# Patient Record
Sex: MALE | Race: ASIAN | NOT HISPANIC OR LATINO
[De-identification: names, ages, dates, MRNs, and addresses within clinical notes are randomized per-mention and may not be internally consistent; named-entity substitution may affect disease eponyms.]

---

## 2019-02-20 ENCOUNTER — TRANSCRIPTION ENCOUNTER (OUTPATIENT)
Age: 34
End: 2019-02-20

## 2019-02-21 ENCOUNTER — RESULT REVIEW (OUTPATIENT)
Age: 34
End: 2019-02-21

## 2019-02-21 ENCOUNTER — INPATIENT (INPATIENT)
Facility: HOSPITAL | Age: 34
LOS: 2 days | Discharge: HOME | DRG: 419 | End: 2019-02-24
Attending: SURGERY | Admitting: SURGERY
Payer: COMMERCIAL

## 2019-02-21 VITALS
HEART RATE: 98 BPM | RESPIRATION RATE: 18 BRPM | DIASTOLIC BLOOD PRESSURE: 102 MMHG | SYSTOLIC BLOOD PRESSURE: 164 MMHG | OXYGEN SATURATION: 100 % | TEMPERATURE: 98 F | WEIGHT: 184.97 LBS

## 2019-02-21 DIAGNOSIS — K81.9 CHOLECYSTITIS, UNSPECIFIED: ICD-10-CM

## 2019-02-21 LAB
ALBUMIN SERPL ELPH-MCNC: 4.5 G/DL — SIGNIFICANT CHANGE UP (ref 3.3–5)
ALP SERPL-CCNC: 138 U/L — HIGH (ref 40–120)
ALT FLD-CCNC: 22 U/L — SIGNIFICANT CHANGE UP (ref 10–45)
ANION GAP SERPL CALC-SCNC: 14 MMOL/L — SIGNIFICANT CHANGE UP (ref 5–17)
APPEARANCE UR: CLEAR — SIGNIFICANT CHANGE UP
APTT BLD: 29.9 SEC — SIGNIFICANT CHANGE UP (ref 27.5–36.3)
AST SERPL-CCNC: 21 U/L — SIGNIFICANT CHANGE UP (ref 10–40)
BASOPHILS # BLD AUTO: 0 K/UL — SIGNIFICANT CHANGE UP (ref 0–0.2)
BASOPHILS NFR BLD AUTO: 0.2 % — SIGNIFICANT CHANGE UP (ref 0–2)
BILIRUB SERPL-MCNC: 0.4 MG/DL — SIGNIFICANT CHANGE UP (ref 0.2–1.2)
BILIRUB UR-MCNC: NEGATIVE — SIGNIFICANT CHANGE UP
BLD GP AB SCN SERPL QL: NEGATIVE — SIGNIFICANT CHANGE UP
BUN SERPL-MCNC: 13 MG/DL — SIGNIFICANT CHANGE UP (ref 7–23)
CALCIUM SERPL-MCNC: 9.9 MG/DL — SIGNIFICANT CHANGE UP (ref 8.4–10.5)
CHLORIDE SERPL-SCNC: 96 MMOL/L — SIGNIFICANT CHANGE UP (ref 96–108)
CO2 SERPL-SCNC: 24 MMOL/L — SIGNIFICANT CHANGE UP (ref 22–31)
COLOR SPEC: SIGNIFICANT CHANGE UP
CREAT SERPL-MCNC: 0.91 MG/DL — SIGNIFICANT CHANGE UP (ref 0.5–1.3)
DIFF PNL FLD: NEGATIVE — SIGNIFICANT CHANGE UP
EOSINOPHIL # BLD AUTO: 0.1 K/UL — SIGNIFICANT CHANGE UP (ref 0–0.5)
EOSINOPHIL NFR BLD AUTO: 0.8 % — SIGNIFICANT CHANGE UP (ref 0–6)
GLUCOSE SERPL-MCNC: 109 MG/DL — HIGH (ref 70–99)
GLUCOSE UR QL: NEGATIVE — SIGNIFICANT CHANGE UP
HCT VFR BLD CALC: 39.2 % — SIGNIFICANT CHANGE UP (ref 39–50)
HGB BLD-MCNC: 14 G/DL — SIGNIFICANT CHANGE UP (ref 13–17)
INR BLD: 1.07 RATIO — SIGNIFICANT CHANGE UP (ref 0.88–1.16)
KETONES UR-MCNC: NEGATIVE — SIGNIFICANT CHANGE UP
LEUKOCYTE ESTERASE UR-ACNC: NEGATIVE — SIGNIFICANT CHANGE UP
LIDOCAIN IGE QN: 31 U/L — SIGNIFICANT CHANGE UP (ref 7–60)
LYMPHOCYTES # BLD AUTO: 1.9 K/UL — SIGNIFICANT CHANGE UP (ref 1–3.3)
LYMPHOCYTES # BLD AUTO: 10.7 % — LOW (ref 13–44)
MCHC RBC-ENTMCNC: 29.5 PG — SIGNIFICANT CHANGE UP (ref 27–34)
MCHC RBC-ENTMCNC: 35.8 GM/DL — SIGNIFICANT CHANGE UP (ref 32–36)
MCV RBC AUTO: 82.4 FL — SIGNIFICANT CHANGE UP (ref 80–100)
MONOCYTES # BLD AUTO: 1.1 K/UL — HIGH (ref 0–0.9)
MONOCYTES NFR BLD AUTO: 6.4 % — SIGNIFICANT CHANGE UP (ref 2–14)
NEUTROPHILS # BLD AUTO: 14.8 K/UL — HIGH (ref 1.8–7.4)
NEUTROPHILS NFR BLD AUTO: 82 % — HIGH (ref 43–77)
NITRITE UR-MCNC: NEGATIVE — SIGNIFICANT CHANGE UP
PH UR: 6.5 — SIGNIFICANT CHANGE UP (ref 5–8)
PLATELET # BLD AUTO: 259 K/UL — SIGNIFICANT CHANGE UP (ref 150–400)
POTASSIUM SERPL-MCNC: 3.8 MMOL/L — SIGNIFICANT CHANGE UP (ref 3.5–5.3)
POTASSIUM SERPL-SCNC: 3.8 MMOL/L — SIGNIFICANT CHANGE UP (ref 3.5–5.3)
PROT SERPL-MCNC: 8.5 G/DL — HIGH (ref 6–8.3)
PROT UR-MCNC: NEGATIVE — SIGNIFICANT CHANGE UP
PROTHROM AB SERPL-ACNC: 12.3 SEC — SIGNIFICANT CHANGE UP (ref 10–12.9)
RBC # BLD: 4.75 M/UL — SIGNIFICANT CHANGE UP (ref 4.2–5.8)
RBC # FLD: 11.8 % — SIGNIFICANT CHANGE UP (ref 10.3–14.5)
RH IG SCN BLD-IMP: POSITIVE — SIGNIFICANT CHANGE UP
RH IG SCN BLD-IMP: POSITIVE — SIGNIFICANT CHANGE UP
SODIUM SERPL-SCNC: 134 MMOL/L — LOW (ref 135–145)
SP GR SPEC: 1.02 — SIGNIFICANT CHANGE UP (ref 1.01–1.02)
UROBILINOGEN FLD QL: NEGATIVE — SIGNIFICANT CHANGE UP
WBC # BLD: 18 K/UL — HIGH (ref 3.8–10.5)
WBC # FLD AUTO: 18 K/UL — HIGH (ref 3.8–10.5)

## 2019-02-21 PROCEDURE — 76700 US EXAM ABDOM COMPLETE: CPT | Mod: 26

## 2019-02-21 PROCEDURE — 99285 EMERGENCY DEPT VISIT HI MDM: CPT | Mod: 25

## 2019-02-21 PROCEDURE — 71046 X-RAY EXAM CHEST 2 VIEWS: CPT | Mod: 26

## 2019-02-21 RX ORDER — KETOROLAC TROMETHAMINE 30 MG/ML
15 SYRINGE (ML) INJECTION ONCE
Qty: 0 | Refills: 0 | Status: DISCONTINUED | OUTPATIENT
Start: 2019-02-21 | End: 2019-02-21

## 2019-02-21 RX ORDER — ACETAMINOPHEN 500 MG
1000 TABLET ORAL ONCE
Qty: 0 | Refills: 0 | Status: COMPLETED | OUTPATIENT
Start: 2019-02-21 | End: 2019-02-21

## 2019-02-21 RX ORDER — CEFOTETAN DISODIUM 1 G
VIAL (EA) INJECTION
Qty: 0 | Refills: 0 | Status: DISCONTINUED | OUTPATIENT
Start: 2019-02-21 | End: 2019-02-24

## 2019-02-21 RX ORDER — SODIUM CHLORIDE 9 MG/ML
1000 INJECTION, SOLUTION INTRAVENOUS
Qty: 0 | Refills: 0 | Status: DISCONTINUED | OUTPATIENT
Start: 2019-02-21 | End: 2019-02-21

## 2019-02-21 RX ORDER — ALBUTEROL 90 UG/1
2 AEROSOL, METERED ORAL EVERY 6 HOURS
Qty: 0 | Refills: 0 | Status: DISCONTINUED | OUTPATIENT
Start: 2019-02-21 | End: 2019-02-21

## 2019-02-21 RX ORDER — PIPERACILLIN AND TAZOBACTAM 4; .5 G/20ML; G/20ML
3.38 INJECTION, POWDER, LYOPHILIZED, FOR SOLUTION INTRAVENOUS ONCE
Qty: 0 | Refills: 0 | Status: COMPLETED | OUTPATIENT
Start: 2019-02-21 | End: 2019-02-21

## 2019-02-21 RX ORDER — SODIUM CHLORIDE 9 MG/ML
1000 INJECTION, SOLUTION INTRAVENOUS
Qty: 0 | Refills: 0 | Status: DISCONTINUED | OUTPATIENT
Start: 2019-02-21 | End: 2019-02-22

## 2019-02-21 RX ORDER — CEFOTETAN DISODIUM 1 G
2 VIAL (EA) INJECTION EVERY 12 HOURS
Qty: 0 | Refills: 0 | Status: DISCONTINUED | OUTPATIENT
Start: 2019-02-22 | End: 2019-02-24

## 2019-02-21 RX ORDER — ACETAMINOPHEN 500 MG
1000 TABLET ORAL ONCE
Qty: 0 | Refills: 0 | Status: DISCONTINUED | OUTPATIENT
Start: 2019-02-21 | End: 2019-02-21

## 2019-02-21 RX ORDER — ONDANSETRON 8 MG/1
4 TABLET, FILM COATED ORAL ONCE
Qty: 0 | Refills: 0 | Status: DISCONTINUED | OUTPATIENT
Start: 2019-02-21 | End: 2019-02-22

## 2019-02-21 RX ORDER — ENOXAPARIN SODIUM 100 MG/ML
40 INJECTION SUBCUTANEOUS DAILY
Qty: 0 | Refills: 0 | Status: DISCONTINUED | OUTPATIENT
Start: 2019-02-21 | End: 2019-02-24

## 2019-02-21 RX ORDER — ENOXAPARIN SODIUM 100 MG/ML
40 INJECTION SUBCUTANEOUS DAILY
Qty: 0 | Refills: 0 | Status: DISCONTINUED | OUTPATIENT
Start: 2019-02-21 | End: 2019-02-21

## 2019-02-21 RX ORDER — CEFOTETAN DISODIUM 1 G
2 VIAL (EA) INJECTION ONCE
Qty: 0 | Refills: 0 | Status: COMPLETED | OUTPATIENT
Start: 2019-02-21 | End: 2019-02-22

## 2019-02-21 RX ORDER — SODIUM CHLORIDE 9 MG/ML
1000 INJECTION INTRAMUSCULAR; INTRAVENOUS; SUBCUTANEOUS ONCE
Qty: 0 | Refills: 0 | Status: COMPLETED | OUTPATIENT
Start: 2019-02-21 | End: 2019-02-21

## 2019-02-21 RX ORDER — ONDANSETRON 8 MG/1
4 TABLET, FILM COATED ORAL ONCE
Qty: 0 | Refills: 0 | Status: COMPLETED | OUTPATIENT
Start: 2019-02-21 | End: 2019-02-21

## 2019-02-21 RX ORDER — OXYCODONE AND ACETAMINOPHEN 5; 325 MG/1; MG/1
1 TABLET ORAL EVERY 4 HOURS
Qty: 0 | Refills: 0 | Status: DISCONTINUED | OUTPATIENT
Start: 2019-02-21 | End: 2019-02-24

## 2019-02-21 RX ORDER — HYDROMORPHONE HYDROCHLORIDE 2 MG/ML
0.5 INJECTION INTRAMUSCULAR; INTRAVENOUS; SUBCUTANEOUS
Qty: 0 | Refills: 0 | Status: DISCONTINUED | OUTPATIENT
Start: 2019-02-21 | End: 2019-02-22

## 2019-02-21 RX ORDER — INFLUENZA VIRUS VACCINE 15; 15; 15; 15 UG/.5ML; UG/.5ML; UG/.5ML; UG/.5ML
0.5 SUSPENSION INTRAMUSCULAR ONCE
Qty: 0 | Refills: 0 | Status: DISCONTINUED | OUTPATIENT
Start: 2019-02-21 | End: 2019-02-24

## 2019-02-21 RX ORDER — OXYCODONE AND ACETAMINOPHEN 5; 325 MG/1; MG/1
2 TABLET ORAL EVERY 4 HOURS
Qty: 0 | Refills: 0 | Status: DISCONTINUED | OUTPATIENT
Start: 2019-02-21 | End: 2019-02-24

## 2019-02-21 RX ORDER — SODIUM CHLORIDE 9 MG/ML
3 INJECTION INTRAMUSCULAR; INTRAVENOUS; SUBCUTANEOUS ONCE
Qty: 0 | Refills: 0 | Status: COMPLETED | OUTPATIENT
Start: 2019-02-21 | End: 2019-02-21

## 2019-02-21 RX ORDER — HYDROMORPHONE HYDROCHLORIDE 2 MG/ML
0.5 INJECTION INTRAMUSCULAR; INTRAVENOUS; SUBCUTANEOUS EVERY 4 HOURS
Qty: 0 | Refills: 0 | Status: DISCONTINUED | OUTPATIENT
Start: 2019-02-21 | End: 2019-02-21

## 2019-02-21 RX ORDER — PIPERACILLIN AND TAZOBACTAM 4; .5 G/20ML; G/20ML
3.38 INJECTION, POWDER, LYOPHILIZED, FOR SOLUTION INTRAVENOUS EVERY 8 HOURS
Qty: 0 | Refills: 0 | Status: DISCONTINUED | OUTPATIENT
Start: 2019-02-21 | End: 2019-02-21

## 2019-02-21 RX ADMIN — Medication 1000 MILLIGRAM(S): at 11:00

## 2019-02-21 RX ADMIN — Medication 400 MILLIGRAM(S): at 16:06

## 2019-02-21 RX ADMIN — SODIUM CHLORIDE 1000 MILLILITER(S): 9 INJECTION INTRAMUSCULAR; INTRAVENOUS; SUBCUTANEOUS at 02:47

## 2019-02-21 RX ADMIN — SODIUM CHLORIDE 3 MILLILITER(S): 9 INJECTION INTRAMUSCULAR; INTRAVENOUS; SUBCUTANEOUS at 01:49

## 2019-02-21 RX ADMIN — HYDROMORPHONE HYDROCHLORIDE 0.5 MILLIGRAM(S): 2 INJECTION INTRAMUSCULAR; INTRAVENOUS; SUBCUTANEOUS at 18:09

## 2019-02-21 RX ADMIN — Medication 1000 MILLIGRAM(S): at 16:50

## 2019-02-21 RX ADMIN — SODIUM CHLORIDE 75 MILLILITER(S): 9 INJECTION, SOLUTION INTRAVENOUS at 22:45

## 2019-02-21 RX ADMIN — ONDANSETRON 4 MILLIGRAM(S): 8 TABLET, FILM COATED ORAL at 01:47

## 2019-02-21 RX ADMIN — HYDROMORPHONE HYDROCHLORIDE 0.5 MILLIGRAM(S): 2 INJECTION INTRAMUSCULAR; INTRAVENOUS; SUBCUTANEOUS at 23:55

## 2019-02-21 RX ADMIN — HYDROMORPHONE HYDROCHLORIDE 0.5 MILLIGRAM(S): 2 INJECTION INTRAMUSCULAR; INTRAVENOUS; SUBCUTANEOUS at 14:00

## 2019-02-21 RX ADMIN — Medication 15 MILLIGRAM(S): at 01:47

## 2019-02-21 RX ADMIN — HYDROMORPHONE HYDROCHLORIDE 0.5 MILLIGRAM(S): 2 INJECTION INTRAMUSCULAR; INTRAVENOUS; SUBCUTANEOUS at 13:19

## 2019-02-21 RX ADMIN — HYDROMORPHONE HYDROCHLORIDE 0.5 MILLIGRAM(S): 2 INJECTION INTRAMUSCULAR; INTRAVENOUS; SUBCUTANEOUS at 18:35

## 2019-02-21 RX ADMIN — Medication 400 MILLIGRAM(S): at 10:14

## 2019-02-21 RX ADMIN — Medication 400 MILLIGRAM(S): at 05:36

## 2019-02-21 RX ADMIN — SODIUM CHLORIDE 125 MILLILITER(S): 9 INJECTION, SOLUTION INTRAVENOUS at 05:46

## 2019-02-21 RX ADMIN — PIPERACILLIN AND TAZOBACTAM 25 GRAM(S): 4; .5 INJECTION, POWDER, LYOPHILIZED, FOR SOLUTION INTRAVENOUS at 13:19

## 2019-02-21 RX ADMIN — Medication 15 MILLIGRAM(S): at 02:17

## 2019-02-21 RX ADMIN — SODIUM CHLORIDE 1000 MILLILITER(S): 9 INJECTION INTRAMUSCULAR; INTRAVENOUS; SUBCUTANEOUS at 01:47

## 2019-02-21 RX ADMIN — Medication 1000 MILLIGRAM(S): at 05:44

## 2019-02-21 RX ADMIN — SODIUM CHLORIDE 125 MILLILITER(S): 9 INJECTION, SOLUTION INTRAVENOUS at 16:06

## 2019-02-21 RX ADMIN — PIPERACILLIN AND TAZOBACTAM 200 GRAM(S): 4; .5 INJECTION, POWDER, LYOPHILIZED, FOR SOLUTION INTRAVENOUS at 05:45

## 2019-02-21 RX ADMIN — ENOXAPARIN SODIUM 40 MILLIGRAM(S): 100 INJECTION SUBCUTANEOUS at 15:00

## 2019-02-21 NOTE — ED ADULT NURSE NOTE - NSIMPLEMENTINTERV_GEN_ALL_ED
Implemented All Universal Safety Interventions:  [place] to call system. Call bell, personal items and telephone within reach. Instruct patient to call for assistance. Room bathroom lighting operational. Non-slip footwear when patient is off stretcher. Physically safe environment: no spills, clutter or unnecessary equipment. Stretcher in lowest position, wheels locked, appropriate side rails in place.

## 2019-02-21 NOTE — ED ADULT NURSE REASSESSMENT NOTE - NS ED NURSE REASSESS COMMENT FT1
0700 Report received from night nurse Jaja Whitney RN. TBA surg. No bed yet. A&Ox4. color pink. skin W&D. lungs clear. abd soft. TTP RUQ. NPO. IV intact without sx of infilt RACF. NPO 0700 Report received from night nurse Jaja Whitney RN. TBA surg. No bed yet. A&Ox4. color pink. skin W&D. lungs clear. abd soft. TTP RUQ. Voiding well. NPO. IV intact without sx of infilt RACF. NPO 0700 Report received from night nurse Jaja Whitney RN. TBA surg. No bed yet. A&Ox4. color pink. skin W&D. lungs clear. abd soft. TTP RUQ. Voiding well. NPO. Surgery scheduled today per surg resident Dr Johan Dickey. No time given yet. IV intact without sx of infilt RACF. NPO

## 2019-02-21 NOTE — ED PROVIDER NOTE - ATTENDING CONTRIBUTION TO CARE
33M p/w RUQ pain worse with eating. Afebrile. Awake and Alert. Lungs CTA. Heart RRR. Abdomen soft, +RUQ TTP, no guarding or rebound, ND. CN II-XII grossly intact. Moves all extremities without lateralization. r/o cholecystitis UA, r/o pancreatitis w/ lipase, medications for gastritis.

## 2019-02-21 NOTE — ED PROVIDER NOTE - CLINICAL SUMMARY MEDICAL DECISION MAKING FREE TEXT BOX
33y Male complaining of abdominal pain concern for colecystitis vs bilary collic, will get labs, fluids, us ruq, analgesia, reassess

## 2019-02-21 NOTE — ED ADULT NURSE REASSESSMENT NOTE - NS ED NURSE REASSESS COMMENT FT1
MD Ortega at bedside updating pt and family on plan of care. Pt awaiting to go to US. MD Ortega at bedside updating pt and family on plan of care. Pt awaiting to be evaluated by Surgery.

## 2019-02-21 NOTE — BRIEF OPERATIVE NOTE - PROCEDURE
<<-----Click on this checkbox to enter Procedure Laparoscopic cholecystectomy  02/21/2019    Active  AGAINES

## 2019-02-21 NOTE — ED ADULT NURSE NOTE - OBJECTIVE STATEMENT
32 yo m reporting to ED for abdominal pain. PMH of Asthma. Pt reporting "sharp", 10/10, constant, non-radiating right upper quadrant abdominal pain. Pt has had some mild nausea but no vomiting. Pt AAOx3, NAD, respirations spontaneous, non-labored, lungs clear bilat, abdomen soft, nondistended, tender in the RUQ, strong peripheral pulses x 4, cap refill < 2 seconds, skin warm and dry. Pt denies headache, dizziness, chest pain, palpitations, cough, SOB, vomiting, diarrhea, blood in the urine, blood in the stool, urinary symptoms, fevers, chills, weakness at this time. Family at bedside. 20 gauge established in the RAC. Safety & comfort measures maintained. Will continue to reassess.

## 2019-02-21 NOTE — H&P ADULT - ASSESSMENT
33M PMHx of Asthma with 3 days of ruq pain with labs demonstrating leukocytosis with left shift but normal bilirubin and LFTs and now ultrasound demonstrating impacted gallstone, otherwise HD stable    - Admit to Dr. Dickey  - Add on for lap chapo  - NPO/ IVF  - IV Abx: Zosyn  - c/w home meds  - Strict Is and Os  - DVT ppx    S Shirin-Jaylin PGY-2  Red [id number]

## 2019-02-21 NOTE — H&P ADULT - NSHPPHYSICALEXAM_GEN_ALL_CORE
Gen: NAD  Resp: breathing comfortably on RA  Cardiac: RRR  GI: soft, nondistendened, TTP of RUQ. no rebound or guarding.  Ext: warm, moving all ext

## 2019-02-21 NOTE — BRIEF OPERATIVE NOTE - OPERATION/FINDINGS
Laparoscopic cholecystectomy. Acute on chronic cholecystitis with spillage of bile during cholecystectomy

## 2019-02-21 NOTE — ED PROVIDER NOTE - NS ED ROS FT
ROS:  GENERAL: No fever, no chills  EYES: no change in vision  HEENT: no trouble swallowing, no trouble speaking  CARDIAC: no chest pain  PULMONARY: no cough, no shortness of breath  GI: + RUQ abdominal pain, + nausea, no vomiting, no diarrhea, no constipation  : No dysuria, no frequency, no change in appearance, or odor of urine  SKIN: no rashes  NEURO: no headache, no weakness  MSK: No joint pain  ~Don Rivera D.O. -Resident

## 2019-02-21 NOTE — PROGRESS NOTE ADULT - ASSESSMENT
33M with RUQ abdominal pain and impacted gallstone in neck of the gallbladder, nl LFTs    Pt d/w Dr. Dickey  - NPO  - pain management  - IV abx  - OR today for lap chapo

## 2019-02-21 NOTE — ED ADULT NURSE NOTE - CHPI ED NUR SYMPTOMS NEG
no diarrhea/no blood in stool/no fever/no chills/no burning urination/no abdominal distension/no hematuria/no dysuria/no vomiting

## 2019-02-21 NOTE — ED PROVIDER NOTE - PROGRESS NOTE DETAILS
updated patient with US results, patient still with pain, consulted surgery, will see patient and getting preop labs surgery admitting patient to Dr. Dickey

## 2019-02-21 NOTE — H&P ADULT - HISTORY OF PRESENT ILLNESS
32yo M pmhx asthma (ventolin inhaler) pw RUQ abdominal pain 2 days ago in the evening, after dinner and started having pain in his RUQ. Since then progressively getting worse. Reports taking tums and pepto bismol with only mild relief. Reports nausea but denies vomiting, denies diarrhea or constipation. Chills when he has the pain. initially was intermittent and now constant. similar episode 3 weeks ago which lasted 3-4 days. patient saw doctor today and he told him to return for a CT scan and ultrasound in a few days. In ED otherwise vitals stable patient remained afebrile. Labs demonstrated leukocytosis to 18 with left shift but normal LFTs. Ultrasound demonstrated impacted gallstone concerning for acute cholecystitis. Surgery called for further management.

## 2019-02-21 NOTE — ED PROVIDER NOTE - PHYSICAL EXAMINATION
Physical Exam:  Gen: NAD, AOx3, non-toxic appearing, able to ambulate without assistance  Head: NCAT  HEENT: EOMI, PEERLA, normal conjunctiva, tongue midline, oral mucosa dry  Lung: CTAB, no respiratory distress, no wheezes/rhonchi/rales B/L, speaking in full sentences  CV: RRR, no murmurs, rubs or gallops  Abd: soft, RUQ tenderness, +murphys, mild epigastric tenderness, ND, no guarding, no rigidity, no CVA tenderness   MSK: no visible deformities, ROM normal in UE/LE, no back pain  Neuro: No focal sensory or motor deficits  Skin: Warm, well perfused, no rash  Psych: normal affect, calm  ~Don Rivera D.O. -Resident

## 2019-02-22 ENCOUNTER — TRANSCRIPTION ENCOUNTER (OUTPATIENT)
Age: 34
End: 2019-02-22

## 2019-02-22 DIAGNOSIS — K81.9 CHOLECYSTITIS, UNSPECIFIED: ICD-10-CM

## 2019-02-22 LAB
ALBUMIN SERPL ELPH-MCNC: 4 G/DL — SIGNIFICANT CHANGE UP (ref 3.3–5)
ALP SERPL-CCNC: 110 U/L — SIGNIFICANT CHANGE UP (ref 40–120)
ALT FLD-CCNC: 51 U/L — HIGH (ref 10–45)
ANION GAP SERPL CALC-SCNC: 13 MMOL/L — SIGNIFICANT CHANGE UP (ref 5–17)
AST SERPL-CCNC: 64 U/L — HIGH (ref 10–40)
BILIRUB SERPL-MCNC: 0.8 MG/DL — SIGNIFICANT CHANGE UP (ref 0.2–1.2)
BUN SERPL-MCNC: 9 MG/DL — SIGNIFICANT CHANGE UP (ref 7–23)
CALCIUM SERPL-MCNC: 9.1 MG/DL — SIGNIFICANT CHANGE UP (ref 8.4–10.5)
CHLORIDE SERPL-SCNC: 99 MMOL/L — SIGNIFICANT CHANGE UP (ref 96–108)
CO2 SERPL-SCNC: 25 MMOL/L — SIGNIFICANT CHANGE UP (ref 22–31)
CREAT SERPL-MCNC: 0.78 MG/DL — SIGNIFICANT CHANGE UP (ref 0.5–1.3)
GLUCOSE SERPL-MCNC: 116 MG/DL — HIGH (ref 70–99)
HCT VFR BLD CALC: 38.4 % — LOW (ref 39–50)
HGB BLD-MCNC: 13.4 G/DL — SIGNIFICANT CHANGE UP (ref 13–17)
MAGNESIUM SERPL-MCNC: 2 MG/DL — SIGNIFICANT CHANGE UP (ref 1.6–2.6)
MCHC RBC-ENTMCNC: 29.1 PG — SIGNIFICANT CHANGE UP (ref 27–34)
MCHC RBC-ENTMCNC: 34.8 GM/DL — SIGNIFICANT CHANGE UP (ref 32–36)
MCV RBC AUTO: 83.6 FL — SIGNIFICANT CHANGE UP (ref 80–100)
PHOSPHATE SERPL-MCNC: 2.6 MG/DL — SIGNIFICANT CHANGE UP (ref 2.5–4.5)
PLATELET # BLD AUTO: 244 K/UL — SIGNIFICANT CHANGE UP (ref 150–400)
POTASSIUM SERPL-MCNC: 3.8 MMOL/L — SIGNIFICANT CHANGE UP (ref 3.5–5.3)
POTASSIUM SERPL-SCNC: 3.8 MMOL/L — SIGNIFICANT CHANGE UP (ref 3.5–5.3)
PROT SERPL-MCNC: 7.7 G/DL — SIGNIFICANT CHANGE UP (ref 6–8.3)
RBC # BLD: 4.59 M/UL — SIGNIFICANT CHANGE UP (ref 4.2–5.8)
RBC # FLD: 12.4 % — SIGNIFICANT CHANGE UP (ref 10.3–14.5)
SODIUM SERPL-SCNC: 137 MMOL/L — SIGNIFICANT CHANGE UP (ref 135–145)
WBC # BLD: 17.6 K/UL — HIGH (ref 3.8–10.5)
WBC # FLD AUTO: 17.6 K/UL — HIGH (ref 3.8–10.5)

## 2019-02-22 RX ORDER — BENZOCAINE AND MENTHOL 5; 1 G/100ML; G/100ML
1 LIQUID ORAL EVERY 4 HOURS
Qty: 0 | Refills: 0 | Status: DISCONTINUED | OUTPATIENT
Start: 2019-02-22 | End: 2019-02-24

## 2019-02-22 RX ORDER — ALBUTEROL 90 UG/1
1 AEROSOL, METERED ORAL EVERY 4 HOURS
Qty: 0 | Refills: 0 | Status: DISCONTINUED | OUTPATIENT
Start: 2019-02-22 | End: 2019-02-24

## 2019-02-22 RX ORDER — POTASSIUM PHOSPHATE, MONOBASIC POTASSIUM PHOSPHATE, DIBASIC 236; 224 MG/ML; MG/ML
15 INJECTION, SOLUTION INTRAVENOUS ONCE
Qty: 0 | Refills: 0 | Status: COMPLETED | OUTPATIENT
Start: 2019-02-22 | End: 2019-02-22

## 2019-02-22 RX ORDER — DOCUSATE SODIUM 100 MG
100 CAPSULE ORAL
Qty: 0 | Refills: 0 | Status: DISCONTINUED | OUTPATIENT
Start: 2019-02-22 | End: 2019-02-24

## 2019-02-22 RX ORDER — ALBUTEROL 90 UG/1
1 AEROSOL, METERED ORAL EVERY 4 HOURS
Qty: 0 | Refills: 0 | Status: COMPLETED | OUTPATIENT
Start: 2019-02-22 | End: 2020-01-21

## 2019-02-22 RX ADMIN — OXYCODONE AND ACETAMINOPHEN 1 TABLET(S): 5; 325 TABLET ORAL at 05:48

## 2019-02-22 RX ADMIN — Medication 100 GRAM(S): at 19:36

## 2019-02-22 RX ADMIN — OXYCODONE AND ACETAMINOPHEN 1 TABLET(S): 5; 325 TABLET ORAL at 12:10

## 2019-02-22 RX ADMIN — OXYCODONE AND ACETAMINOPHEN 1 TABLET(S): 5; 325 TABLET ORAL at 12:40

## 2019-02-22 RX ADMIN — Medication 100 GRAM(S): at 08:08

## 2019-02-22 RX ADMIN — ALBUTEROL 1 PUFF(S): 90 AEROSOL, METERED ORAL at 19:42

## 2019-02-22 RX ADMIN — OXYCODONE AND ACETAMINOPHEN 2 TABLET(S): 5; 325 TABLET ORAL at 19:07

## 2019-02-22 RX ADMIN — ALBUTEROL 1 PUFF(S): 90 AEROSOL, METERED ORAL at 10:26

## 2019-02-22 RX ADMIN — HYDROMORPHONE HYDROCHLORIDE 0.5 MILLIGRAM(S): 2 INJECTION INTRAMUSCULAR; INTRAVENOUS; SUBCUTANEOUS at 00:10

## 2019-02-22 RX ADMIN — POTASSIUM PHOSPHATE, MONOBASIC POTASSIUM PHOSPHATE, DIBASIC 62.5 MILLIMOLE(S): 236; 224 INJECTION, SOLUTION INTRAVENOUS at 10:21

## 2019-02-22 RX ADMIN — Medication 100 MILLIGRAM(S): at 19:36

## 2019-02-22 RX ADMIN — OXYCODONE AND ACETAMINOPHEN 1 TABLET(S): 5; 325 TABLET ORAL at 01:30

## 2019-02-22 RX ADMIN — BENZOCAINE AND MENTHOL 1 LOZENGE: 5; 1 LIQUID ORAL at 19:36

## 2019-02-22 RX ADMIN — BENZOCAINE AND MENTHOL 1 LOZENGE: 5; 1 LIQUID ORAL at 10:26

## 2019-02-22 RX ADMIN — OXYCODONE AND ACETAMINOPHEN 2 TABLET(S): 5; 325 TABLET ORAL at 18:37

## 2019-02-22 RX ADMIN — OXYCODONE AND ACETAMINOPHEN 1 TABLET(S): 5; 325 TABLET ORAL at 05:18

## 2019-02-22 RX ADMIN — ENOXAPARIN SODIUM 40 MILLIGRAM(S): 100 INJECTION SUBCUTANEOUS at 12:07

## 2019-02-22 RX ADMIN — OXYCODONE AND ACETAMINOPHEN 1 TABLET(S): 5; 325 TABLET ORAL at 02:30

## 2019-02-22 NOTE — DISCHARGE NOTE ADULT - NS AS ACTIVITY OBS
No Heavy lifting/straining/Showering allowed/Do not drive or operate machinery/while taking pain medications/Do not make important decisions

## 2019-02-22 NOTE — DISCHARGE NOTE ADULT - FINDINGS/TREATMENT
· Operative Findings	Laparoscopic cholecystectomy. Acute on chronic cholecystitis with spillage of bile during cholecystectomy

## 2019-02-22 NOTE — DISCHARGE NOTE ADULT - MEDICATION SUMMARY - MEDICATIONS TO TAKE
I will START or STAY ON the medications listed below when I get home from the hospital:    Percocet 5/325 oral tablet  -- 1 tab(s) by mouth every 8 hours, As Needed -for moderate pain MDD:3   -- Caution federal law prohibits the transfer of this drug to any person other  than the person for whom it was prescribed.  May cause drowsiness.  Alcohol may intensify this effect.  Use care when operating dangerous machinery.  This prescription cannot be refilled.  This product contains acetaminophen.  Do not use  with any other product containing acetaminophen to prevent possible liver damage.  Using more of this medication than prescribed may cause serious breathing problems.    -- Indication: For moderate pain    Augmentin 875 mg-125 mg oral tablet  -- 1 tab(s) by mouth 2 times a day MDD:2  -- Finish all this medication unless otherwise directed by prescriber.  Take with food or milk.    -- Indication: For s/p lap cholecystectomy

## 2019-02-22 NOTE — DISCHARGE NOTE ADULT - CARE PLAN
Principal Discharge DX:	Cholecystitis  Goal:	recovery from surgery  Assessment and plan of treatment:	WOUND CARE: You may shower. Pat Dry abdomen. Leave the white steri strips in place, they will fall off on their own in approximately 5-7 days. You will be discharged with JAMIA drains. You will need to empty them and record outputs accurately. This will be taught to you by the nursing staff. Please do not remove the JAMIA drains. They will be removed in the office. Please bring to the office accurate records of output.   BATHING: Please do not submerge wound underwater. You may shower and/or sponge bathe.  ACTIVITY: No heavy lifting anything more than 10-15lbs or straining. Otherwise, you may return to your usual level of physical activity. If you are taking narcotic pain medication (such as Percocet), do NOT drive a car, operate machinery or make important decisions.  DIET: Low fat diet  NOTIFY YOUR SURGEON IF: You have any bleeding that does not stop, any pus draining from your wound, any fever (over 100.4 F) or chills, persistent nausea/vomiting with inability to tolerate food or liquids, persistent diarrhea, or if your pain is not controlled on your discharge pain medications.  FOLLOW-UP:  1. Please call to make a follow-up appointment within one week of discharge.   2. Please follow up with your primary care physician in one week regarding your hospitalization.

## 2019-02-22 NOTE — DISCHARGE NOTE ADULT - CONDITIONS AT DISCHARGE
Pt A&Ox4. vss. pt complains of no pain at this time. iv site removed, no signs of redness or swelling noted at site. safety checks completed. tolerating diet, ambulating, voiding, abd ss wnl. pt educated on proper JAMIA care, pt verbalized understanding & demonstrated teach back. pt safety maintained.

## 2019-02-22 NOTE — CONSULT NOTE ADULT - PROBLEM SELECTOR RECOMMENDATION 9
- s/p laparoscopic cholecystectomy   - IV abx, IVF  - diet advanced to regular  - pain control   - further plans per surgery team

## 2019-02-22 NOTE — DISCHARGE NOTE ADULT - HOSPITAL COURSE
32yo M pmhx asthma (ventolin inhaler) pw RUQ abdominal pain 2 days ago in the evening, after dinner and started having pain in his RUQ. Since then progressively getting worse. Reports taking tums and pepto bismol with only mild relief. Reports nausea but denies vomiting, denies diarrhea or constipation. Chills when he has the pain. initially was intermittent and now constant. similar episode 3 weeks ago which lasted 3-4 days. patient saw doctor today and he told him to return for a CT scan and ultrasound in a few days. In ED otherwise vitals stable patient remained afebrile. Labs demonstrated leukocytosis to 18 with left shift but normal LFTs. Ultrasound demonstrated impacted gallstone concerning for acute cholecystitis. Surgery called for further management.  Admit to Dr. Dickey, Add on for lap chapo, NPO/ IVF, IV Abx: Zosyn.  Later that night, pt underwent lap chapo, drain placement.  Pt tolerated procedure well, was extubated, sent to pacu then back to [facility]. Pt continued IV abx. GI consulted, agree with surgery plan.  At the time of discharge, the patient was hemodynamically stable, was tolerating PO diet, was voiding urine and passing flatus, was ambulating, and was comfortable with adequate pain control. The patient was instructed to follow up with Dr. Dickey within 1-2 weeks after discharge from the hospital. The patient/family felt comfortable with discharge. The patient was discharged to home with JAMIA. The patient had no other issues. 32yo M pmhx asthma (ventolin inhaler) pw RUQ abdominal pain 2 days ago in the evening, after dinner and started having pain in his RUQ. Since then progressively getting worse. Reports taking tums and pepto bismol with only mild relief. Reports nausea but denies vomiting, denies diarrhea or constipation. Chills when he has the pain. initially was intermittent and now constant. similar episode 3 weeks ago which lasted 3-4 days. patient saw doctor today and he told him to return for a CT scan and ultrasound in a few days. In ED otherwise vitals stable patient remained afebrile. Labs demonstrated leukocytosis to 18 with left shift but normal LFTs. Ultrasound demonstrated impacted gallstone concerning for acute cholecystitis. Surgery called for further management.  Admit to Dr. Dickey, Add on for lap chapo, NPO/ IVF, IV Abx: Zosyn.  Later that night, pt underwent lap chapo, drain placement.  Pt tolerated procedure well, was extubated, sent to pacu then back to [facility]. Pt continued IV abx. GI consulted, agree with surgery plan.  At the time of discharge, the patient was hemodynamically stable, was tolerating PO diet, was voiding urine and passing flatus, was ambulating, and was comfortable with adequate pain control. The patient was instructed to follow up with Dr. Dickey in 1 week after discharge from the hospital. The patient/family felt comfortable with discharge. The patient was discharged to home with JAMIA. The patient had no other issues.

## 2019-02-22 NOTE — DISCHARGE NOTE ADULT - PLAN OF CARE
recovery from surgery WOUND CARE: You may shower. Pat Dry abdomen. Leave the white steri strips in place, they will fall off on their own in approximately 5-7 days. You will be discharged with JAMIA drains. You will need to empty them and record outputs accurately. This will be taught to you by the nursing staff. Please do not remove the JAMIA drains. They will be removed in the office. Please bring to the office accurate records of output.   BATHING: Please do not submerge wound underwater. You may shower and/or sponge bathe.  ACTIVITY: No heavy lifting anything more than 10-15lbs or straining. Otherwise, you may return to your usual level of physical activity. If you are taking narcotic pain medication (such as Percocet), do NOT drive a car, operate machinery or make important decisions.  DIET: Low fat diet  NOTIFY YOUR SURGEON IF: You have any bleeding that does not stop, any pus draining from your wound, any fever (over 100.4 F) or chills, persistent nausea/vomiting with inability to tolerate food or liquids, persistent diarrhea, or if your pain is not controlled on your discharge pain medications.  FOLLOW-UP:  1. Please call to make a follow-up appointment within one week of discharge.   2. Please follow up with your primary care physician in one week regarding your hospitalization.

## 2019-02-22 NOTE — DISCHARGE NOTE ADULT - OTHER SIGNIFICANT FINDINGS
see op note    < from: Xray Chest 2 Views PA/Lat (02.21.19 @ 01:58) >  EXAM:  XR CHEST PA LAT 2V                            PROCEDURE DATE:  02/21/2019            INTERPRETATION:  CLINICAL INDICATION: Chest pain.    EXAM: Frontal and lateral views of the chest with no prior radiographs.    FINDINGS:   The heart size is normal.   The lungs are clear. There are no pleural effusions or pneumothorax.  The bones are unremarkable.    IMPRESSION:   Clear lungs.    < end of copied text >      < from: US Abdomen Complete (02.21.19 @ 04:13) >  EXAM:  US ABDOMEN COMPLETE                            PROCEDURE DATE:  02/21/2019            INTERPRETATION:  CLINICAL INFORMATION: Right upper quadrant abdominal   pain for 3 days    COMPARISON: None available.    TECHNIQUE: Sonography of the abdomen.     FINDINGS:    Liver: Within normal limits.    Bile ducts: Normal caliber. Common bile duct measures 5 mm.     Gallbladder: Distended gallbladder with gallbladder sludge and nonmobile   calculi in the fundus and neck.  No pericholecystic fluid or gallbladder   wall thickening. Negative sonographic Duvall sign.    Pancreas: Visualized portions are within normal limits.    Spleen: 11.1 cm. Within normal limits.    Right kidney: 11.4 cm. No hydronephrosis.    Left kidney: 9.7 cm.  No hydronephrosis.    Ascites: None.    Aorta and IVC: Visualized portions are within normal limits.    IMPRESSION:     Cholelithiasis and gallbladder sludge without gallbladder wall thickening   or pericholecystic fluid. Negative sonographic Duvall's sign.    < end of copied text >

## 2019-02-22 NOTE — CONSULT NOTE ADULT - SUBJECTIVE AND OBJECTIVE BOX
Chief Complaint:  Patient is a 33y old  Male who presents with a chief complaint of acute cholecytstitis (2019 09:21)      HPI:    Allergies:  No Known Allergies      Medications:  ALBUTerol    90 MICROgram(s) HFA Inhaler 1 Puff(s) Inhalation every 4 hours PRN  benzocaine 15 mG/menthol 3.6 mG Lozenge 1 Lozenge Oral every 4 hours PRN  cefoTEtan  IVPB      cefoTEtan  IVPB 2 Gram(s) IV Intermittent every 12 hours  enoxaparin Injectable 40 milliGRAM(s) SubCutaneous daily  influenza   Vaccine 0.5 milliLiter(s) IntraMuscular once  lactated ringers. 1000 milliLiter(s) IV Continuous <Continuous>  oxyCODONE    5 mG/acetaminophen 325 mG 1 Tablet(s) Oral every 4 hours PRN  oxyCODONE    5 mG/acetaminophen 325 mG 2 Tablet(s) Oral every 4 hours PRN      PMHX/PSHX:  Asthma      Family history:      Social History:     ROS:     General:  No wt loss, fevers, chills, night sweats, fatigue,   Eyes:  Good vision, no reported pain  ENT:  No sore throat, pain, runny nose, dysphagia  CV:  No pain, palpitations, hypo/hypertension  Resp:  No dyspnea, cough, tachypnea, wheezing  GI:  No pain, No nausea, No vomiting, No diarrhea, No constipation, No weight loss, No fever, No pruritis, No rectal bleeding, No tarry stools, No dysphagia,  :  No pain, bleeding, incontinence, nocturia  Muscle:  No pain, weakness  Neuro:  No weakness, tingling, memory problems  Psych:  No fatigue, insomnia, mood problems, depression  Endocrine:  No polyuria, polydipsia, cold/heat intolerance  Heme:  No petechiae, ecchymosis, easy bruisability  Skin:  No rash, tattoos, scars, edema      PHYSICAL EXAM:   Vital Signs:  Vital Signs Last 24 Hrs  T(C): 36.9 (2019 09:24), Max: 37.6 (2019 19:09)  T(F): 98.4 (2019 09:24), Max: 99.7 (2019 19:09)  HR: 89 (2019 09:24) (73 - 650)  BP: 116/77 (2019 09:24) (115/65 - 147/91)  BP(mean): 87 (2019 00:00) (87 - 104)  RR: 18 (2019 09:24) (14 - 18)  SpO2: 95% (2019 09:24) (92% - 100%)  Daily Height in cm: 175.26 (2019 19:12)    Daily     GENERAL:  Appears stated age, well-groomed, well-nourished, no distress  HEENT:  NC/AT,  conjunctivae clear and pink, no thyromegaly, nodules, adenopathy, no JVD, sclera -anicteric  CHEST:  Full & symmetric excursion, no increased effort, breath sounds clear  HEART:  Regular rhythm, S1, S2, no murmur/rub/S3/S4, no abdominal bruit, no edema  ABDOMEN:  Soft, non-tender, non-distended, normoactive bowel sounds,  no masses ,no hepato-splenomegaly, no signs of chronic liver disease  EXTEREMITIES:  no cyanosis,clubbing or edema  SKIN:  No rash/erythema/ecchymoses/petechiae/wounds/abscess/warm/dry  NEURO:  Alert, oriented, no asterixis, no tremor, no encephalopathy    LABS:                        13.4   17.6  )-----------( 244      ( 2019 07:14 )             38.4         137  |  99  |  9   ----------------------------<  116<H>  3.8   |  25  |  0.78    Ca    9.1      2019 07:14  Phos  2.6       Mg     2.0         TPro  7.7  /  Alb  4.0  /  TBili  0.8  /  DBili  x   /  AST  64<H>  /  ALT  51<H>  /  AlkPhos  110      LIVER FUNCTIONS - ( 2019 07:14 )  Alb: 4.0 g/dL / Pro: 7.7 g/dL / ALK PHOS: 110 U/L / ALT: 51 U/L / AST: 64 U/L / GGT: x           PT/INR - ( 2019 05:15 )   PT: 12.3 sec;   INR: 1.07 ratio         PTT - ( 2019 05:15 )  PTT:29.9 sec  Urinalysis Basic - ( 2019 16:46 )    Color: Light Yellow / Appearance: Clear / S.020 / pH: x  Gluc: x / Ketone: Negative  / Bili: Negative / Urobili: Negative   Blood: x / Protein: Negative / Nitrite: Negative   Leuk Esterase: Negative / RBC: x / WBC x   Sq Epi: x / Non Sq Epi: x / Bacteria: x          Imaging: Chief Complaint:  Patient is a 33y old  Male who presents with a chief complaint of acute cholecytstitis (2019 09:21)      HPI:34yo M pmhx asthma (ventolin inhaler) pw RUQ abdominal pain 2 days ago in the evening, after dinner and started having pain in his RUQ. Since then progressively getting worse. Reports taking tums and pepto bismol with only mild relief. Reports nausea but denies vomiting, denies diarrhea or constipation. Chills when he has the pain. initially was intermittent and now constant. similar episode 3 weeks ago which lasted 3-4 days. patient saw doctor today and he told him to return for a CT scan and ultrasound in a few days. In ED otherwise vitals stable patient remained afebrile. Labs demonstrated leukocytosis to 18 with left shift but normal LFTs. Ultrasound demonstrated impacted gallstone concerning for acute cholecystitis. Surgery called for further management. Pt is now POD 1 s/p laparoscopic cholecystectomy. Pt admits to incisional tenderness, he denies n/v. No bm yet. Tolerating low fat diet well. No hx of egd/colonoscopy in the past.       Allergies:  No Known Allergies      Medications:  ALBUTerol    90 MICROgram(s) HFA Inhaler 1 Puff(s) Inhalation every 4 hours PRN  benzocaine 15 mG/menthol 3.6 mG Lozenge 1 Lozenge Oral every 4 hours PRN  cefoTEtan  IVPB      cefoTEtan  IVPB 2 Gram(s) IV Intermittent every 12 hours  enoxaparin Injectable 40 milliGRAM(s) SubCutaneous daily  influenza   Vaccine 0.5 milliLiter(s) IntraMuscular once  lactated ringers. 1000 milliLiter(s) IV Continuous <Continuous>  oxyCODONE    5 mG/acetaminophen 325 mG 1 Tablet(s) Oral every 4 hours PRN  oxyCODONE    5 mG/acetaminophen 325 mG 2 Tablet(s) Oral every 4 hours PRN      PMHX/PSHX:  Asthma      Family history:      Social History:     ROS:     General:  No wt loss, fevers, chills, night sweats, fatigue,   Eyes:  Good vision, no reported pain  ENT:  No sore throat, pain, runny nose, dysphagia  CV:  No pain, palpitations, hypo/hypertension  Resp:  No dyspnea, cough, tachypnea, wheezing  GI: see HPI  :  No pain, bleeding, incontinence, nocturia  Muscle:  No pain, weakness  Neuro:  No weakness, tingling, memory problems  Psych:  No fatigue, insomnia, mood problems, depression  Endocrine:  No polyuria, polydipsia, cold/heat intolerance  Heme:  No petechiae, ecchymosis, easy bruisability  Skin:  No rash, tattoos, scars, edema      PHYSICAL EXAM:   Vital Signs:  Vital Signs Last 24 Hrs  T(C): 36.9 (2019 09:24), Max: 37.6 (2019 19:09)  T(F): 98.4 (2019 09:24), Max: 99.7 (2019 19:09)  HR: 89 (2019 09:24) (73 - 650)  BP: 116/77 (2019 09:24) (115/65 - 147/91)  BP(mean): 87 (2019 00:00) (87 - 104)  RR: 18 (2019 09:24) (14 - 18)  SpO2: 95% (2019 09:24) (92% - 100%)  Daily Height in cm: 175.26 (2019 19:12)    Daily     GENERAL:  Appears stated age, well-groomed, well-nourished, no distress  HEENT:  NC/AT,  conjunctivae clear and pink, no thyromegaly, nodules, adenopathy, no JVD, sclera -anicteric  CHEST:  Full & symmetric excursion, no increased effort, breath sounds clear  HEART:  Regular rhythm, S1, S2, no murmur/rub/S3/S4, no abdominal bruit, no edema  ABDOMEN: Abd soft, appropriately TTP, ND. Dressings c/d/i. Drain almost full with SSF  EXTEREMITIES:  no cyanosis,clubbing or edema  SKIN:  No rash/erythema/ecchymoses/petechiae/wounds/abscess/warm/dry  NEURO:  Alert, oriented, no asterixis, no tremor, no encephalopathy    LABS:                        13.4   17.6  )-----------( 244      ( 2019 07:14 )             38.4     02-    137  |  99  |  9   ----------------------------<  116<H>  3.8   |  25  |  0.78    Ca    9.1      2019 07:14  Phos  2.6       Mg     2.0         TPro  7.7  /  Alb  4.0  /  TBili  0.8  /  DBili  x   /  AST  64<H>  /  ALT  51<H>  /  AlkPhos  110      LIVER FUNCTIONS - ( 2019 07:14 )  Alb: 4.0 g/dL / Pro: 7.7 g/dL / ALK PHOS: 110 U/L / ALT: 51 U/L / AST: 64 U/L / GGT: x           PT/INR - ( 2019 05:15 )   PT: 12.3 sec;   INR: 1.07 ratio         PTT - ( 2019 05:15 )  PTT:29.9 sec  Urinalysis Basic - ( 2019 16:46 )    Color: Light Yellow / Appearance: Clear / S.020 / pH: x  Gluc: x / Ketone: Negative  / Bili: Negative / Urobili: Negative   Blood: x / Protein: Negative / Nitrite: Negative   Leuk Esterase: Negative / RBC: x / WBC x   Sq Epi: x / Non Sq Epi: x / Bacteria: x          Imaging:

## 2019-02-22 NOTE — DISCHARGE NOTE ADULT - FUNCTIONAL STATUS DATE
Problem: Communication  Goal: The ability to communicate needs accurately and effectively will improve  Outcome: PROGRESSING AS EXPECTED  Education on when to call and ask for assistance continuing. Pt. Verbalizing understanding.     Problem: Safety  Goal: Will remain free from injury  Outcome: PROGRESSING AS EXPECTED  Calling as needed for assistance.          22-Feb-2019 24-Feb-2019

## 2019-02-22 NOTE — DISCHARGE NOTE ADULT - PATIENT PORTAL LINK FT
You can access the [facility], offered by [facility], by registering with the following website: [email or web address]

## 2019-02-22 NOTE — DISCHARGE NOTE ADULT - CARE PROVIDER_API CALL
Johan Dickey)  Surgery  [address], [address]  [place] [address]  Phone: [phone]  Fax: [phone]  Follow Up Time: 1 week

## 2019-02-22 NOTE — PROGRESS NOTE ADULT - ASSESSMENT
33M with asthma, POD#1 s/p Lap chapo    - Regular diet  - Pain management  - Albuterol inhaler prn q4h   - Drain education  - OOB & ambulate  - discharge later today if tolerating diet  - f/u with Dr. Dickey in the office in 2wks 33M with asthma, POD#1 s/p Ginny cowan    Pt d/w Dr. Dickey  - Regular diet  - Pain management  - Albuterol inhaler prn q4h   - Drain education  - c/w Abx  - OOB & ambulate  - possibly d/c later today or tomorrow 33M with asthma, POD#1 s/p Ginny cowan    Pt d/w Dr. Dickey  - f/u labs  - Pain management  - Albuterol inhaler prn q4h   - Drain education  - c/w Abx  - OOB & ambulate  - possibly d/c tomorrow with po abx

## 2019-02-23 DIAGNOSIS — Z71.89 OTHER SPECIFIED COUNSELING: ICD-10-CM

## 2019-02-23 LAB
ALBUMIN SERPL ELPH-MCNC: 3.5 G/DL — SIGNIFICANT CHANGE UP (ref 3.3–5)
ALP SERPL-CCNC: 101 U/L — SIGNIFICANT CHANGE UP (ref 40–120)
ALT FLD-CCNC: 56 U/L — HIGH (ref 10–45)
ANION GAP SERPL CALC-SCNC: 12 MMOL/L — SIGNIFICANT CHANGE UP (ref 5–17)
AST SERPL-CCNC: 46 U/L — HIGH (ref 10–40)
BILIRUB SERPL-MCNC: 0.6 MG/DL — SIGNIFICANT CHANGE UP (ref 0.2–1.2)
BUN SERPL-MCNC: 12 MG/DL — SIGNIFICANT CHANGE UP (ref 7–23)
CALCIUM SERPL-MCNC: 8.8 MG/DL — SIGNIFICANT CHANGE UP (ref 8.4–10.5)
CHLORIDE SERPL-SCNC: 100 MMOL/L — SIGNIFICANT CHANGE UP (ref 96–108)
CO2 SERPL-SCNC: 26 MMOL/L — SIGNIFICANT CHANGE UP (ref 22–31)
CREAT SERPL-MCNC: 0.86 MG/DL — SIGNIFICANT CHANGE UP (ref 0.5–1.3)
GLUCOSE SERPL-MCNC: 85 MG/DL — SIGNIFICANT CHANGE UP (ref 70–99)
HCT VFR BLD CALC: 34.9 % — LOW (ref 39–50)
HGB BLD-MCNC: 11.7 G/DL — LOW (ref 13–17)
MAGNESIUM SERPL-MCNC: 2.1 MG/DL — SIGNIFICANT CHANGE UP (ref 1.6–2.6)
MCHC RBC-ENTMCNC: 28.4 PG — SIGNIFICANT CHANGE UP (ref 27–34)
MCHC RBC-ENTMCNC: 33.5 GM/DL — SIGNIFICANT CHANGE UP (ref 32–36)
MCV RBC AUTO: 84.6 FL — SIGNIFICANT CHANGE UP (ref 80–100)
PHOSPHATE SERPL-MCNC: 2.3 MG/DL — LOW (ref 2.5–4.5)
PLATELET # BLD AUTO: 223 K/UL — SIGNIFICANT CHANGE UP (ref 150–400)
POTASSIUM SERPL-MCNC: 3.7 MMOL/L — SIGNIFICANT CHANGE UP (ref 3.5–5.3)
POTASSIUM SERPL-SCNC: 3.7 MMOL/L — SIGNIFICANT CHANGE UP (ref 3.5–5.3)
PROT SERPL-MCNC: 6.9 G/DL — SIGNIFICANT CHANGE UP (ref 6–8.3)
RBC # BLD: 4.13 M/UL — LOW (ref 4.2–5.8)
RBC # FLD: 12.5 % — SIGNIFICANT CHANGE UP (ref 10.3–14.5)
SODIUM SERPL-SCNC: 138 MMOL/L — SIGNIFICANT CHANGE UP (ref 135–145)
WBC # BLD: 15.1 K/UL — HIGH (ref 3.8–10.5)
WBC # FLD AUTO: 15.1 K/UL — HIGH (ref 3.8–10.5)

## 2019-02-23 RX ORDER — POTASSIUM PHOSPHATE, MONOBASIC POTASSIUM PHOSPHATE, DIBASIC 236; 224 MG/ML; MG/ML
15 INJECTION, SOLUTION INTRAVENOUS ONCE
Qty: 0 | Refills: 0 | Status: COMPLETED | OUTPATIENT
Start: 2019-02-23 | End: 2019-02-23

## 2019-02-23 RX ADMIN — Medication 100 MILLIGRAM(S): at 05:02

## 2019-02-23 RX ADMIN — OXYCODONE AND ACETAMINOPHEN 2 TABLET(S): 5; 325 TABLET ORAL at 01:49

## 2019-02-23 RX ADMIN — ENOXAPARIN SODIUM 40 MILLIGRAM(S): 100 INJECTION SUBCUTANEOUS at 11:22

## 2019-02-23 RX ADMIN — Medication 100 GRAM(S): at 07:48

## 2019-02-23 RX ADMIN — Medication 100 MILLIGRAM(S): at 17:06

## 2019-02-23 RX ADMIN — OXYCODONE AND ACETAMINOPHEN 2 TABLET(S): 5; 325 TABLET ORAL at 14:10

## 2019-02-23 RX ADMIN — POTASSIUM PHOSPHATE, MONOBASIC POTASSIUM PHOSPHATE, DIBASIC 62.5 MILLIMOLE(S): 236; 224 INJECTION, SOLUTION INTRAVENOUS at 14:28

## 2019-02-23 RX ADMIN — OXYCODONE AND ACETAMINOPHEN 2 TABLET(S): 5; 325 TABLET ORAL at 00:54

## 2019-02-23 RX ADMIN — Medication 100 GRAM(S): at 19:55

## 2019-02-23 RX ADMIN — OXYCODONE AND ACETAMINOPHEN 2 TABLET(S): 5; 325 TABLET ORAL at 13:40

## 2019-02-23 NOTE — PROGRESS NOTE ADULT - ASSESSMENT
33M s/p Laparoscopic cholecystectomy 2/21 recovering well on the floor.    Given WBC of 15, keep pt for another day  Pain control  c/w regular diet  c/w  cefotetan until 2/25 or 2/26  DVT ppx  Out of bed and encourage early ambulation  Incentive spirometry    [facility]  [id number]

## 2019-02-23 NOTE — PROGRESS NOTE ADULT - PROBLEM SELECTOR PLAN 1
- s/p laparoscopic cholecystectomy   - IV abx, IVF  - diet advanced to regular  - pain control   - further plans per surgery team.

## 2019-02-24 VITALS
TEMPERATURE: 98 F | SYSTOLIC BLOOD PRESSURE: 128 MMHG | DIASTOLIC BLOOD PRESSURE: 83 MMHG | HEART RATE: 95 BPM | RESPIRATION RATE: 16 BRPM | OXYGEN SATURATION: 97 %

## 2019-02-24 LAB
ALBUMIN SERPL ELPH-MCNC: 3.5 G/DL — SIGNIFICANT CHANGE UP (ref 3.3–5)
ALP SERPL-CCNC: 144 U/L — HIGH (ref 40–120)
ALT FLD-CCNC: 68 U/L — HIGH (ref 10–45)
ANION GAP SERPL CALC-SCNC: 13 MMOL/L — SIGNIFICANT CHANGE UP (ref 5–17)
AST SERPL-CCNC: 47 U/L — HIGH (ref 10–40)
BILIRUB DIRECT SERPL-MCNC: 0.1 MG/DL — SIGNIFICANT CHANGE UP (ref 0–0.2)
BILIRUB INDIRECT FLD-MCNC: 0.5 MG/DL — SIGNIFICANT CHANGE UP (ref 0.2–1)
BILIRUB SERPL-MCNC: 0.6 MG/DL — SIGNIFICANT CHANGE UP (ref 0.2–1.2)
BUN SERPL-MCNC: 9 MG/DL — SIGNIFICANT CHANGE UP (ref 7–23)
CALCIUM SERPL-MCNC: 9.1 MG/DL — SIGNIFICANT CHANGE UP (ref 8.4–10.5)
CHLORIDE SERPL-SCNC: 97 MMOL/L — SIGNIFICANT CHANGE UP (ref 96–108)
CO2 SERPL-SCNC: 25 MMOL/L — SIGNIFICANT CHANGE UP (ref 22–31)
CREAT SERPL-MCNC: 0.79 MG/DL — SIGNIFICANT CHANGE UP (ref 0.5–1.3)
GLUCOSE SERPL-MCNC: 97 MG/DL — SIGNIFICANT CHANGE UP (ref 70–99)
HCT VFR BLD CALC: 35.3 % — LOW (ref 39–50)
HGB BLD-MCNC: 11.2 G/DL — LOW (ref 13–17)
MAGNESIUM SERPL-MCNC: 2.2 MG/DL — SIGNIFICANT CHANGE UP (ref 1.6–2.6)
MCHC RBC-ENTMCNC: 26.5 PG — LOW (ref 27–34)
MCHC RBC-ENTMCNC: 31.6 GM/DL — LOW (ref 32–36)
MCV RBC AUTO: 83.7 FL — SIGNIFICANT CHANGE UP (ref 80–100)
PHOSPHATE SERPL-MCNC: 3.3 MG/DL — SIGNIFICANT CHANGE UP (ref 2.5–4.5)
PLATELET # BLD AUTO: 260 K/UL — SIGNIFICANT CHANGE UP (ref 150–400)
POTASSIUM SERPL-MCNC: 3.9 MMOL/L — SIGNIFICANT CHANGE UP (ref 3.5–5.3)
POTASSIUM SERPL-SCNC: 3.9 MMOL/L — SIGNIFICANT CHANGE UP (ref 3.5–5.3)
PROT SERPL-MCNC: 7.2 G/DL — SIGNIFICANT CHANGE UP (ref 6–8.3)
RBC # BLD: 4.22 M/UL — SIGNIFICANT CHANGE UP (ref 4.2–5.8)
RBC # FLD: 12.4 % — SIGNIFICANT CHANGE UP (ref 10.3–14.5)
SODIUM SERPL-SCNC: 135 MMOL/L — SIGNIFICANT CHANGE UP (ref 135–145)
WBC # BLD: 12.6 K/UL — HIGH (ref 3.8–10.5)
WBC # FLD AUTO: 12.6 K/UL — HIGH (ref 3.8–10.5)

## 2019-02-24 PROCEDURE — 71046 X-RAY EXAM CHEST 2 VIEWS: CPT

## 2019-02-24 PROCEDURE — 85730 THROMBOPLASTIN TIME PARTIAL: CPT

## 2019-02-24 PROCEDURE — 80048 BASIC METABOLIC PNL TOTAL CA: CPT

## 2019-02-24 PROCEDURE — 86850 RBC ANTIBODY SCREEN: CPT

## 2019-02-24 PROCEDURE — 83690 ASSAY OF LIPASE: CPT

## 2019-02-24 PROCEDURE — 93005 ELECTROCARDIOGRAM TRACING: CPT

## 2019-02-24 PROCEDURE — 85610 PROTHROMBIN TIME: CPT

## 2019-02-24 PROCEDURE — 85027 COMPLETE CBC AUTOMATED: CPT

## 2019-02-24 PROCEDURE — 99285 EMERGENCY DEPT VISIT HI MDM: CPT | Mod: 25

## 2019-02-24 PROCEDURE — 96361 HYDRATE IV INFUSION ADD-ON: CPT

## 2019-02-24 PROCEDURE — C1889: CPT

## 2019-02-24 PROCEDURE — 94640 AIRWAY INHALATION TREATMENT: CPT

## 2019-02-24 PROCEDURE — 84100 ASSAY OF PHOSPHORUS: CPT

## 2019-02-24 PROCEDURE — 80053 COMPREHEN METABOLIC PANEL: CPT

## 2019-02-24 PROCEDURE — 96375 TX/PRO/DX INJ NEW DRUG ADDON: CPT

## 2019-02-24 PROCEDURE — 86900 BLOOD TYPING SEROLOGIC ABO: CPT

## 2019-02-24 PROCEDURE — 76700 US EXAM ABDOM COMPLETE: CPT

## 2019-02-24 PROCEDURE — 96374 THER/PROPH/DIAG INJ IV PUSH: CPT

## 2019-02-24 PROCEDURE — 83735 ASSAY OF MAGNESIUM: CPT

## 2019-02-24 PROCEDURE — 80076 HEPATIC FUNCTION PANEL: CPT

## 2019-02-24 PROCEDURE — 81003 URINALYSIS AUTO W/O SCOPE: CPT

## 2019-02-24 PROCEDURE — 86901 BLOOD TYPING SEROLOGIC RH(D): CPT

## 2019-02-24 RX ADMIN — OXYCODONE AND ACETAMINOPHEN 2 TABLET(S): 5; 325 TABLET ORAL at 08:41

## 2019-02-24 RX ADMIN — OXYCODONE AND ACETAMINOPHEN 1 TABLET(S): 5; 325 TABLET ORAL at 15:40

## 2019-02-24 RX ADMIN — Medication 100 MILLIGRAM(S): at 05:50

## 2019-02-24 RX ADMIN — OXYCODONE AND ACETAMINOPHEN 2 TABLET(S): 5; 325 TABLET ORAL at 09:15

## 2019-02-24 RX ADMIN — OXYCODONE AND ACETAMINOPHEN 1 TABLET(S): 5; 325 TABLET ORAL at 15:06

## 2019-02-24 RX ADMIN — ENOXAPARIN SODIUM 40 MILLIGRAM(S): 100 INJECTION SUBCUTANEOUS at 11:19

## 2019-02-24 RX ADMIN — Medication 100 GRAM(S): at 08:42

## 2019-02-24 NOTE — PROGRESS NOTE ADULT - ASSESSMENT
33M s/p Laparoscopic cholecystectomy 2/21 recovering well on the floor.    Given WBC of 15, keep pt for another day  Pain control  c/w regular diet  c/w  cefotetan until 2/25 or 2/26  DVT ppx  Out of bed and encourage early ambulation  Incentive spirometry    [facility] Surgery  [id number] 33M s/p Laparoscopic cholecystectomy 2/21 recovering well on the floor.    If WBC < 14, discharge patient  Pain control  c/w regular diet  c/w  cefotetan until 2/25 or 2/26  DVT ppx  Out of bed and encourage early ambulation  Incentive spirometry    Red Surgery  [id number]

## 2019-02-24 NOTE — PROGRESS NOTE ADULT - ATTENDING COMMENTS
pt seen and examined  agree with note above  tolerating diet  no gi fxn  f/u labs in am  cont iv abx  poss dc home sun if wbc improving  po abx augmentin 875mg bid x 7 days on discharge  f/u as outpt in seven days - mon 3/4 for JAMIA removal.
he is comfortable, denies nausea, vomiting. Tolerated PO intake. Abd pain controlled.    ICU Vital Signs Last 24 Hrs  T(C): 37.2 (24 Feb 2019 13:21), Max: 37.9 (23 Feb 2019 22:04)  T(F): 98.9 (24 Feb 2019 13:21), Max: 100.2 (23 Feb 2019 22:04)  HR: 85 (24 Feb 2019 13:21) (85 - 98)  BP: 117/77 (24 Feb 2019 13:21) (112/74 - 137/93)  BP(mean): --  ABP: --  ABP(mean): --  RR: 16 (24 Feb 2019 13:21) (16 - 18)  SpO2: 96% (24 Feb 2019 13:21) (94% - 97%)    Gen: comfortable, awake, alert  Abd: soft, not tender, incisions clean, dry, intact. JAMIA serosanguinous.                          11.2   12.6  )-----------( 260      ( 24 Feb 2019 07:19 )             35.3     33 year old man s/p lap chapo  1. Discharge to home today with PO Augmentin, follow up with Dr. Dickey for drain removal.

## 2019-02-24 NOTE — PROGRESS NOTE ADULT - SUBJECTIVE AND OBJECTIVE BOX
INTERVAL HPI/OVERNIGHT EVENTS: Pt seen and examined. Pain managed. Pt c/o some tightness with breathing, requested inhaler; Pt c/o throat soreness with mucus. Afebrile.    STATUS POST:  Ginny chapo    POST OPERATIVE DAY #: 1    MEDICATIONS  (STANDING):  cefoTEtan  IVPB      cefoTEtan  IVPB 2 Gram(s) IV Intermittent every 12 hours  enoxaparin Injectable 40 milliGRAM(s) SubCutaneous daily  influenza   Vaccine 0.5 milliLiter(s) IntraMuscular once  lactated ringers. 1000 milliLiter(s) (75 mL/Hr) IV Continuous <Continuous>  potassium phosphate IVPB 15 milliMole(s) IV Intermittent once    MEDICATIONS  (PRN):  ALBUTerol    90 MICROgram(s) HFA Inhaler 1 Puff(s) Inhalation every 4 hours PRN Shortness of Breath and/or Wheezing  oxyCODONE    5 mG/acetaminophen 325 mG 1 Tablet(s) Oral every 4 hours PRN Moderate Pain (4 - 6)  oxyCODONE    5 mG/acetaminophen 325 mG 2 Tablet(s) Oral every 4 hours PRN Severe Pain (7 - 10)      Vital Signs Last 24 Hrs  T(C): 36.8 (2019 04:20), Max: 37.6 (2019 19:09)  T(F): 98.3 (2019 04:20), Max: 99.7 (2019 19:09)  HR: 95 (2019 04:20) (73 - 650)  BP: 133/93 (2019 04:20) (115/65 - 147/91)  BP(mean): 87 (2019 00:00) (87 - 104)  RR: 17 (2019 04:20) (14 - 18)  SpO2: 95% (2019 04:20) (92% - 100%)    PHYSICAL EXAM:      Constitutional: NAD    Gastrointestinal: Abd soft, appropriately TTP, ND. Dressings c/d/i. Drain almost full with SSF.              I&O's Detail    2019 07:01  -  2019 07:00  --------------------------------------------------------  IN:    lactated ringers.: 750 mL    lactated ringers.: 500 mL    lactated ringers.: 675 mL    Oral Fluid: 550 mL  Total IN: 2475 mL    OUT:    Bulb: 70 mL    Voided: 875 mL  Total OUT: 945 mL    Total NET: 1530 mL          LABS:                        13.4   17.6  )-----------( 244      ( 2019 07:14 )             38.4         137  |  99  |  9   ----------------------------<  116<H>  3.8   |  25  |  0.78    Ca    9.1      2019 07:14  Phos  2.6       Mg     2.0         TPro  7.7  /  Alb  4.0  /  TBili  0.8  /  DBili  x   /  AST  64<H>  /  ALT  51<H>  /  AlkPhos  110      PT/INR - ( 2019 05:15 )   PT: 12.3 sec;   INR: 1.07 ratio         PTT - ( 2019 05:15 )  PTT:29.9 sec  Urinalysis Basic - ( 2019 16:46 )    Color: Light Yellow / Appearance: Clear / S.020 / pH: x  Gluc: x / Ketone: Negative  / Bili: Negative / Urobili: Negative   Blood: x / Protein: Negative / Nitrite: Negative   Leuk Esterase: Negative / RBC: x / WBC x   Sq Epi: x / Non Sq Epi: x / Bacteria: x        RADIOLOGY & ADDITIONAL STUDIES:
Colorectal Surgery Progress Note    Subjective:  Yesterday patient continued to have an elevated WBC count although downtrending. No acute events overnight. Pt seen at bedside. Pt is tolerating regular diet. Pt is ambulating well. Pt amenable to discahrge today.    Objective:  Physical Exam:  Gen: NAD  Resp: No increased work of breathing  Abd: s, nt, nd  drain w SS output        T(C): 37.8 (02-24-19 @ 01:01), Max: 37.9 (02-23-19 @ 22:04)  HR: 96 (02-24-19 @ 01:01) (90 - 106)  BP: 125/77 (02-24-19 @ 01:01) (119/80 - 137/93)  RR: 18 (02-24-19 @ 01:01) (17 - 18)  SpO2: 96% (02-24-19 @ 01:01) (94% - 97%)    02-22-19 @ 07:01  -  02-23-19 @ 07:00  --------------------------------------------------------  IN: 1380 mL / OUT: 1430 mL / NET: -50 mL    02-23-19 @ 07:01  -  02-24-19 @ 06:35  --------------------------------------------------------  IN: 1470 mL / OUT: 1230 mL / NET: 240 mL        LABS                        11.7   15.1  )-----------( 223      ( 23 Feb 2019 07:17 )             34.9     02-23    138  |  100  |  12  ----------------------------<  85  3.7   |  26  |  0.86    Ca    8.8      23 Feb 2019 07:17  Phos  2.3     02-23  Mg     2.1     02-23    TPro  6.9  /  Alb  3.5  /  TBili  0.6  /  DBili  x   /  AST  46<H>  /  ALT  56<H>  /  AlkPhos  101  02-23
Colorectal Surgery Progress Note    Subjective:  Yesterday patient continued to have an elevated WBC count. No acute events overnight. Pt seen at bedside. Pt is tolerating regular diet. Pt is ambulating well. Pt was concerned about his elevated WBC count. Pt was informed of what an elevated WBC count means and was reassured he was progressing well.     Objective:  Physical Exam:  Gen: NAD  Resp: No increased work of breathing  Abd: s, nt, nd  drain w SS output      T(C): 37.2 (19 @ 09:08), Max: 37.3 (19 @ 18:30)  HR: 100 (19 @ 09:08) (88 - 100)  BP: 120/69 (19 @ 09:08) (115/70 - 132/82)  RR: 17 (19 @ 09:08) (16 - 18)  SpO2: 95% (19 @ 09:08) (95% - 97%)    19 @ 07:01  -  19 @ 07:00  --------------------------------------------------------  IN: 1380 mL / OUT: 1430 mL / NET: -50 mL    19 @ 07:01  -  19 @ 12:06  --------------------------------------------------------  IN: 260 mL / OUT: 250 mL / NET: 10 mL        LABS                        11.7   15.1  )-----------( 223      ( 2019 07:17 )             34.9         138  |  100  |  12  ----------------------------<  85  3.7   |  26  |  0.86    Ca    8.8      2019 07:17  Phos  2.3       Mg     2.1         TPro  6.9  /  Alb  3.5  /  TBili  0.6  /  DBili  x   /  AST  46<H>  /  ALT  56<H>  /  AlkPhos  101        Urinalysis Basic - ( 2019 16:46 )    Color: Light Yellow / Appearance: Clear / S.020 / pH: x  Gluc: x / Ketone: Negative  / Bili: Negative / Urobili: Negative   Blood: x / Protein: Negative / Nitrite: Negative   Leuk Esterase: Negative / RBC: x / WBC x   Sq Epi: x / Non Sq Epi: x / Bacteria: x
INTERVAL HPI/OVERNIGHT EVENTS: Pt seen and examined. Pt c/o RUQ pain and lower abdominal pressure. Pt c/o being hungry. Denies nausea.    MEDICATIONS  (STANDING):  acetaminophen  IVPB .. 1000 milliGRAM(s) IV Intermittent once  acetaminophen  IVPB .. 1000 milliGRAM(s) IV Intermittent once  enoxaparin Injectable 40 milliGRAM(s) SubCutaneous daily  influenza   Vaccine 0.5 milliLiter(s) IntraMuscular once  lactated ringers. 1000 milliLiter(s) (125 mL/Hr) IV Continuous <Continuous>  piperacillin/tazobactam IVPB. 3.375 Gram(s) IV Intermittent every 8 hours    MEDICATIONS  (PRN):  ALBUTerol    90 MICROgram(s) HFA Inhaler 2 Puff(s) Inhalation every 6 hours PRN Shortness of Breath and/or Wheezing  HYDROmorphone  Injectable 0.5 milliGRAM(s) IV Push every 4 hours PRN Severe Pain (7 - 10)      Vital Signs Last 24 Hrs  T(C): 36.9 (21 Feb 2019 13:12), Max: 37 (21 Feb 2019 08:48)  T(F): 98.4 (21 Feb 2019 13:12), Max: 98.6 (21 Feb 2019 08:48)  HR: 84 (21 Feb 2019 13:12) (80 - 98)  BP: 129/86 (21 Feb 2019 13:12) (129/86 - 164/102)  BP(mean): --  RR: 18 (21 Feb 2019 13:12) (16 - 18)  SpO2: 99% (21 Feb 2019 13:12) (99% - 100%)    PHYSICAL EXAM:      Constitutional: NAD    Gastrointestinal: Abd soft, TTP in RUQ, ND            I&O's Detail      LABS:                        14.0   18.0  )-----------( 259      ( 21 Feb 2019 02:31 )             39.2     02-21    134<L>  |  96  |  13  ----------------------------<  109<H>  3.8   |  24  |  0.91    Ca    9.9      21 Feb 2019 02:31    TPro  8.5<H>  /  Alb  4.5  /  TBili  0.4  /  DBili  x   /  AST  21  /  ALT  22  /  AlkPhos  138<H>  02-21    PT/INR - ( 21 Feb 2019 05:15 )   PT: 12.3 sec;   INR: 1.07 ratio         PTT - ( 21 Feb 2019 05:15 )  PTT:29.9 sec      RADIOLOGY & ADDITIONAL STUDIES:    EXAM:  US ABDOMEN COMPLETE                            PROCEDURE DATE:  02/21/2019            INTERPRETATION:  CLINICAL INFORMATION: Right upper quadrant abdominal   pain for 3 days    COMPARISON: None available.    TECHNIQUE: Sonography of the abdomen.     FINDINGS:    Liver: Within normal limits.    Bile ducts: Normal caliber. Common bile duct measures 5 mm.     Gallbladder: Distended gallbladder with gallbladder sludge and nonmobile   calculi in the fundus and neck.  No pericholecystic fluid or gallbladder   wall thickening. Negative sonographic Duvall sign.    Pancreas: Visualized portions are within normal limits.    Spleen: 11.1 cm. Within normal limits.    Right kidney: 11.4 cm. No hydronephrosis.    Left kidney: 9.7 cm.  No hydronephrosis.    Ascites: None.    Aorta and IVC: Visualized portions are within normal limits.    IMPRESSION:     Cholelithiasis and gallbladder sludge without gallbladder wall thickening   or pericholecystic fluid. Negative sonographic Duvall's sign.                AB OLIVAS M.D., RADIOLOGY RESIDENT  This document has been electronically signed.  MANISHA GREEN D.O.; ATTENDING RADIOLOGIST  This document has been electronically signed. Feb 21 2019  4:29AM
Interval Events: pt seen and examined. Admits to improvement in abdominal pain. He denies n/v. Tolerating PO well today  + passing flatus, no bm yet     MEDICATIONS  (STANDING):  cefoTEtan  IVPB      cefoTEtan  IVPB 2 Gram(s) IV Intermittent every 12 hours  docusate sodium 100 milliGRAM(s) Oral two times a day  enoxaparin Injectable 40 milliGRAM(s) SubCutaneous daily  influenza   Vaccine 0.5 milliLiter(s) IntraMuscular once    MEDICATIONS  (PRN):  ALBUTerol    90 MICROgram(s) HFA Inhaler 1 Puff(s) Inhalation every 4 hours PRN Shortness of Breath and/or Wheezing  benzocaine 15 mG/menthol 3.6 mG Lozenge 1 Lozenge Oral every 4 hours PRN Sore Throat  oxyCODONE    5 mG/acetaminophen 325 mG 1 Tablet(s) Oral every 4 hours PRN Moderate Pain (4 - 6)  oxyCODONE    5 mG/acetaminophen 325 mG 2 Tablet(s) Oral every 4 hours PRN Severe Pain (7 - 10)      Allergies    No Known Allergies    Intolerances        Review of Systems:    General:  No wt loss, fevers, chills, night sweats,fatigue,   Eyes:  Good vision, no reported pain  ENT:  No sore throat, pain, runny nose, dysphagia  CV:  No pain, palpitations, hypo/hypertension  Resp:  No dyspnea, cough, tachypnea, wheezing  GI:  No pain, No nausea, No vomiting, No diarrhea, No constipation, No weight loss, No fever, No pruritis, No rectal bleeding, No melena, No dysphagia  :  No pain, bleeding, incontinence, nocturia  Muscle:  No pain, weakness  Neuro:  No weakness, tingling, memory problems  Psych:  No fatigue, insomnia, mood problems, depression  Endocrine:  No polyuria, polydypsia, cold/heat intolerance  Heme:  No petechiae, ecchymosis, easy bruisability  Skin:  No rash, tattoos, scars, edema      Vital Signs Last 24 Hrs  T(C): 37.2 (2019 09:08), Max: 37.3 (2019 18:30)  T(F): 99 (2019 09:08), Max: 99.2 (2019 18:30)  HR: 100 (2019 09:08) (88 - 100)  BP: 120/69 (2019 09:08) (115/70 - 132/82)  BP(mean): --  RR: 17 (2019 09:08) (16 - 18)  SpO2: 95% (2019 09:08) (95% - 97%)    PHYSICAL EXAM:    GENERAL:  Appears stated age, well-groomed, well-nourished, no distress  HEENT:  NC/AT,  conjunctivae clear and pink, no thyromegaly, nodules, adenopathy, no JVD, sclera -anicteric  CHEST:  Full & symmetric excursion, no increased effort, breath sounds clear  HEART:  Regular rhythm, S1, S2, no murmur/rub/S3/S4, no abdominal bruit, no edema  ABDOMEN: Abd soft, appropriately TTP, ND. Dressings c/d/i. Drain almost full with SSF  EXTEREMITIES:  no cyanosis, clubbing or edema  SKIN:  No rash/erythema/ecchymoses/petechiae/wounds/abscess/warm/dry  NEURO:  Alert, oriented, no asterixis, no tremor, no encephalopathy      LABS:                        11.7   15.1  )-----------( 223      ( 2019 07:17 )             34.9     02-23    138  |  100  |  12  ----------------------------<  85  3.7   |  26  |  0.86    Ca    8.8      2019 07:17  Phos  2.3     -  Mg     2.1     -    TPro  6.9  /  Alb  3.5  /  TBili  0.6  /  DBili  x   /  AST  46<H>  /  ALT  56<H>  /  AlkPhos  101  02-23      Urinalysis Basic - ( 2019 16:46 )    Color: Light Yellow / Appearance: Clear / S.020 / pH: x  Gluc: x / Ketone: Negative  / Bili: Negative / Urobili: Negative   Blood: x / Protein: Negative / Nitrite: Negative   Leuk Esterase: Negative / RBC: x / WBC x   Sq Epi: x / Non Sq Epi: x / Bacteria: x        RADIOLOGY & ADDITIONAL TESTS:
Interval Events: pt seen and examined. Admits to improvement in abdominal pain. He denies n/v. Tolerating PO well.  WBC trending down    MEDICATIONS  (STANDING):  cefoTEtan  IVPB      cefoTEtan  IVPB 2 Gram(s) IV Intermittent every 12 hours  docusate sodium 100 milliGRAM(s) Oral two times a day  enoxaparin Injectable 40 milliGRAM(s) SubCutaneous daily  influenza   Vaccine 0.5 milliLiter(s) IntraMuscular once    MEDICATIONS  (PRN):  ALBUTerol    90 MICROgram(s) HFA Inhaler 1 Puff(s) Inhalation every 4 hours PRN Shortness of Breath and/or Wheezing  benzocaine 15 mG/menthol 3.6 mG Lozenge 1 Lozenge Oral every 4 hours PRN Sore Throat  oxyCODONE    5 mG/acetaminophen 325 mG 1 Tablet(s) Oral every 4 hours PRN Moderate Pain (4 - 6)  oxyCODONE    5 mG/acetaminophen 325 mG 2 Tablet(s) Oral every 4 hours PRN Severe Pain (7 - 10)      Allergies    No Known Allergies    Intolerances        Review of Systems:    General:  No wt loss, fevers, chills, night sweats,fatigue,   Eyes:  Good vision, no reported pain  ENT:  No sore throat, pain, runny nose, dysphagia  CV:  No pain, palpitations, hypo/hypertension  Resp:  No dyspnea, cough, tachypnea, wheezing  GI:  No pain, No nausea, No vomiting, No diarrhea, No constipation, No weight loss, No fever, No pruritis, No rectal bleeding, No melena, No dysphagia  :  No pain, bleeding, incontinence, nocturia  Muscle:  No pain, weakness  Neuro:  No weakness, tingling, memory problems  Psych:  No fatigue, insomnia, mood problems, depression  Endocrine:  No polyuria, polydypsia, cold/heat intolerance  Heme:  No petechiae, ecchymosis, easy bruisability  Skin:  No rash, tattoos, scars, edema      Vital Signs Last 24 Hrs  T(C): 37.6 (24 Feb 2019 06:45), Max: 37.9 (23 Feb 2019 22:04)  T(F): 99.7 (24 Feb 2019 06:45), Max: 100.2 (23 Feb 2019 22:04)  HR: 98 (24 Feb 2019 06:45) (90 - 106)  BP: 133/82 (24 Feb 2019 06:45) (119/80 - 137/93)  BP(mean): --  RR: 17 (24 Feb 2019 06:45) (17 - 18)  SpO2: 95% (24 Feb 2019 06:45) (94% - 97%)    PHYSICAL EXAM:    GENERAL:  Appears stated age, well-groomed, well-nourished, no distress  HEENT:  NC/AT,  conjunctivae clear and pink, no thyromegaly, nodules, adenopathy, no JVD, sclera -anicteric  CHEST:  Full & symmetric excursion, no increased effort, breath sounds clear  HEART:  Regular rhythm, S1, S2, no murmur/rub/S3/S4, no abdominal bruit, no edema  ABDOMEN: Abd soft, appropriately TTP, ND. Dressings c/d/i. Drain almost full with SSF  EXTEREMITIES:  no cyanosis, clubbing or edema  SKIN:  No rash/erythema/ecchymoses/petechiae/wounds/abscess/warm/dry  NEURO:  Alert, oriented, no asterixis, no tremor, no encephalopathy      LABS:                        11.2   12.6  )-----------( 260      ( 24 Feb 2019 07:19 )             35.3     02-24    135  |  97  |  9   ----------------------------<  97  3.9   |  25  |  0.79    Ca    9.1      24 Feb 2019 07:19  Phos  3.3     02-24  Mg     2.2     02-24    TPro  7.2  /  Alb  3.5  /  TBili  0.6  /  DBili  0.1  /  AST  47<H>  /  ALT  68<H>  /  AlkPhos  144<H>  02-24          RADIOLOGY & ADDITIONAL TESTS:
S: Patient underwent laparoscopic cholecystectomy and tolerated procedure without  issue and sent to PACU.  Patient denies chest pain, shortness of breath, nausea, vomiting, lightheadedness, or dizziness.  Pain was well controlled.     O:T(C): 36.6 (02-22-19 @ 01:18), Max: 37.6 (02-21-19 @ 19:09)  HR: 76 (02-22-19 @ 01:18) (73 - 650)  BP: 130/77 (02-22-19 @ 01:18) (115/65 - 147/91)  RR: 17 (02-22-19 @ 01:18) (14 - 18)  SpO2: 95% (02-22-19 @ 01:18) (92% - 100%)  Wt(kg): --                        14.0   18.0  )-----------( 259      ( 21 Feb 2019 02:31 )             39.2        02-21    134<L>  |  96  |  13  ----------------------------<  109<H>  3.8   |  24  |  0.91    Ca    9.9      21 Feb 2019 02:31      Gen: NAD  Abd: Soft, nontender, mildly distended.  No palpable masses/hematomas. Port incisions with dry bandages in place. RUQ drain SS         Assessment/Plan:  33y Male s/p Laparoscopic cholecystectomy 2/21 recovering well on the floor    Pain control  ADAT  4 more days of abx- cefotetan   DVT PPX  Out of bed and encourage early ambulation  Incentive spirometry.

## 2019-02-24 NOTE — PROGRESS NOTE ADULT - REASON FOR ADMISSION
acute cholecytstitis

## 2019-02-26 LAB — SURGICAL PATHOLOGY STUDY: SIGNIFICANT CHANGE UP

## 2021-06-09 PROBLEM — J45.909 UNSPECIFIED ASTHMA, UNCOMPLICATED: Chronic | Status: ACTIVE | Noted: 2019-02-21

## 2021-06-23 ENCOUNTER — APPOINTMENT (OUTPATIENT)
Dept: DERMATOLOGY | Facility: CLINIC | Age: 36
End: 2021-06-23

## 2021-11-09 ENCOUNTER — APPOINTMENT (OUTPATIENT)
Dept: DERMATOLOGY | Facility: CLINIC | Age: 36
End: 2021-11-09

## 2022-01-04 ENCOUNTER — APPOINTMENT (OUTPATIENT)
Dept: DERMATOLOGY | Facility: CLINIC | Age: 37
End: 2022-01-04
Payer: MEDICAID

## 2022-01-04 ENCOUNTER — NON-APPOINTMENT (OUTPATIENT)
Age: 37
End: 2022-01-04

## 2022-01-04 DIAGNOSIS — L70.0 ACNE VULGARIS: ICD-10-CM

## 2022-01-04 DIAGNOSIS — L81.4 OTHER MELANIN HYPERPIGMENTATION: ICD-10-CM

## 2022-01-04 PROCEDURE — 99204 OFFICE O/P NEW MOD 45 MIN: CPT

## 2022-01-04 NOTE — HISTORY OF PRESENT ILLNESS
[FreeTextEntry1] : black spots on face [de-identified] : 36 year old male with black spots on face. no tx tried.

## 2022-01-04 NOTE — PHYSICAL EXAM
[FreeTextEntry3] : AAOx3, pleasant, NAD, no visual lymphadenopathy\par hair, scalp, face, nose, eyelids, ears, lips, oropharynx, neck, chest, abdomen, back, right arm, left arm, nails, and hands examined with all normal findings,\par pertinent findings include:\par \par multiple papules, pustules and open comedones on face\par lentigines on face

## 2022-01-04 NOTE — ASSESSMENT
[FreeTextEntry1] : lentigines\par education\par \par acne\par tretinoin 0.025% cream at bedtime; SED

## 2022-01-05 RX ORDER — TRETINOIN 0.25 MG/G
0.03 CREAM TOPICAL
Qty: 1 | Refills: 1 | Status: ACTIVE | COMMUNITY
Start: 2022-01-04

## 2024-11-07 NOTE — ED ADULT NURSE NOTE - PAIN RATING/NUMBER SCALE (0-10): ACTIVITY
Yaneth Waldron  tolerated treatment well with no complications.      Yaneth Waldron is aware of future appt on 11/14/24 at 0830.     AVS Declined by Yaneth Waldron       10

## 2024-11-22 NOTE — PACU DISCHARGE NOTE - NSCLINEINSERTRD_GEN_ALL_CORE
Ivan Feliciano Jr  1989    Specialist or PCP: Eliecer Golden MD  Symptoms: chest  Pain , at the E/R Monday   Call Back #: [phone] (mobile)  Transferred call to:  triage      No